# Patient Record
(demographics unavailable — no encounter records)

---

## 2024-10-15 NOTE — ADDENDUM
[FreeTextEntry1] : This note was partly authored by Bolivar Mackay working as a scribe for SEGUN Rico. I, SEGUN Rico, have reviewed the content of this note and confirm it is true and accurate. I personally performed the history and physical examination and made all the decisions. 10/10/2024.

## 2024-10-15 NOTE — ASSESSMENT
[FreeTextEntry1] : UA and urine cytology  We had a lengthy discussion about OAB meds, I explained the R&B's and the SE's which include elevated BP, dry mouth, dry eyes, constipation, and long term SE's including dementia. I also talked about what is known of Myrbetriq as it is in a different drug class. Pt expressed understanding and wants to hold off on medication  I explained timed voiding a behavioral technique where you void about every 3 hours even against the urge to hold your urine. Pt expresses understanding.  Also mentioned that only voiding 3 or 4 x's/ day is not "too frequent".  fu 6 weeks   As brother has h/o of prostate ca, was curious what the PSA levels were when brother diagnosed.

## 2024-10-15 NOTE — HISTORY OF PRESENT ILLNESS
[FreeTextEntry1] : 10/10/2024: SANTHOSH PRESTON is a 54 year-old M h/o pancreatitis (taking Creon) who presents with c/o urinary frequency. past smoker for 15 years about 2 packs a day  Recent GI visit on 3/5/2024 and as per GI note he admits to significant drinking approximately 3 times a week. 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   Denies h/o kidney stones, gross blood in urine  Urinary frequency 3-4x's/day. Reports that on the way to the bathroom he nearly wets himself and when starting to urinate "it comes out very fast". Has been happening for several years.   Stream is strong and steady. After voiding he feels satisfied and empty.  Denies straining, post void dribbling, or double voiding.  Denies h/o surgery  Had x1 colonoscopy and was normal  1/30/2024 CT Abd/P w IV cont showed: kidneys normal, bladder normal  PSA at 0.25 ng/mL on 5/21/2024  A1CG in 2022 showed pt was high risk (pre dm)  FMHx: Denies kidney bladder ca Brother dx'ed with prostate ca at 58 yo, now 62 yo. Brother did not have surgery and is alive managing his ca. Mom living at 97 yo Dad passed at 86 by natural old age as per pt. He did have stroke 7 years before passing.  last voided 4 hours ago. he does not have the urge sensation to urinate right now PVR at 51 cc

## 2024-10-15 NOTE — HISTORY OF PRESENT ILLNESS
[FreeTextEntry1] : 10/10/2024: SANTHOSH PRESTON is a 54 year-old M h/o pancreatitis (taking Creon) who presents with c/o urinary frequency. past smoker for 15 years about 2 packs a day  Recent GI visit on 3/5/2024 and as per GI note he admits to significant drinking approximately 3 times a week. 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   Denies h/o kidney stones, gross blood in urine  Urinary frequency 3-4x's/day. Reports that on the way to the bathroom he nearly wets himself and when starting to urinate "it comes out very fast". Has been happening for several years.   Stream is strong and steady. After voiding he feels satisfied and empty.  Denies straining, post void dribbling, or double voiding.  Denies h/o surgery  Had x1 colonoscopy and was normal  1/30/2024 CT Abd/P w IV cont showed: kidneys normal, bladder normal  PSA at 0.25 ng/mL on 5/21/2024  A1CG in 2022 showed pt was high risk (pre dm)  FMHx: Denies kidney bladder ca Brother dx'ed with prostate ca at 58 yo, now 60 yo. Brother did not have surgery and is alive managing his ca. Mom living at 95 yo Dad passed at 86 by natural old age as per pt. He did have stroke 7 years before passing.  last voided 4 hours ago. he does not have the urge sensation to urinate right now PVR at 51 cc

## 2024-10-15 NOTE — HISTORY OF PRESENT ILLNESS
[FreeTextEntry1] : 10/10/2024: SANTHOSH PRESTON is a 54 year-old M h/o pancreatitis (taking Creon) who presents with c/o urinary frequency. past smoker for 15 years about 2 packs a day  Recent GI visit on 3/5/2024 and as per GI note he admits to significant drinking approximately 3 times a week. 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   Denies h/o kidney stones, gross blood in urine  Urinary frequency 3-4x's/day. Reports that on the way to the bathroom he nearly wets himself and when starting to urinate "it comes out very fast". Has been happening for several years.   Stream is strong and steady. After voiding he feels satisfied and empty.  Denies straining, post void dribbling, or double voiding.  Denies h/o surgery  Had x1 colonoscopy and was normal  1/30/2024 CT Abd/P w IV cont showed: kidneys normal, bladder normal  PSA at 0.25 ng/mL on 5/21/2024  A1CG in 2022 showed pt was high risk (pre dm)  FMHx: Denies kidney bladder ca Brother dx'ed with prostate ca at 60 yo, now 62 yo. Brother did not have surgery and is alive managing his ca. Mom living at 97 yo Dad passed at 86 by natural old age as per pt. He did have stroke 7 years before passing.  last voided 4 hours ago. he does not have the urge sensation to urinate right now PVR at 51 cc

## 2025-02-26 NOTE — PLAN
[FreeTextEntry1] : Venipuncture with labs drawn in office today. Continue sildenafil as needed for erectile dysfunction. Continue Ramipril for Hypertension. Medications reviewed. Continue present medications. Return for annual physical in 3 months.

## 2025-02-26 NOTE — HISTORY OF PRESENT ILLNESS
[FreeTextEntry1] : follow up. [de-identified] : SANTHOSH PRESTON is a 54 year old male, with history of Hypertension, who presents to the office for a follow-up visit. The patient reports to be feeling reasonably well at this time with no complaints.

## 2025-02-26 NOTE — ASSESSMENT
[FreeTextEntry1] : Dr. Ivy reviewed with Levar his metabolic panel in office today. Panel indicated that Levar was dehydrated at the time of the test.  Dr. Ivy reviewed with Levar his vitals in office today. Levar's vitals were normal  Levar should return in 3 months for annual physical.

## 2025-02-26 NOTE — HISTORY OF PRESENT ILLNESS
[FreeTextEntry1] : follow up. [de-identified] : SANTHOSH PRESTON is a 54 year old male, with history of Hypertension, who presents to the office for a follow-up visit. The patient reports to be feeling reasonably well at this time with no complaints.

## 2025-02-26 NOTE — SIGNATURES
[TextEntry] : This note was written by Francesco Payne on 02/25/2025 acting as medical scribe for Dr. Rolanda Ivy. I, Dr. Rolanda Ivy, have read and attest that all the information, medical decision making and discharge instructions within are true and accurate.

## 2025-07-15 NOTE — PHYSICAL EXAM
[No Acute Distress] : no acute distress [Well Nourished] : well nourished [Well Developed] : well developed [Well-Appearing] : well-appearing [Normal Sclera/Conjunctiva] : normal sclera/conjunctiva [PERRL] : pupils equal round and reactive to light [EOMI] : extraocular movements intact [Normal Outer Ear/Nose] : the outer ears and nose were normal in appearance [Normal Oropharynx] : the oropharynx was normal [No JVD] : no jugular venous distention [No Lymphadenopathy] : no lymphadenopathy [Supple] : supple [Thyroid Normal, No Nodules] : the thyroid was normal and there were no nodules present [No Respiratory Distress] : no respiratory distress  [No Accessory Muscle Use] : no accessory muscle use [Clear to Auscultation] : lungs were clear to auscultation bilaterally [Normal Rate] : normal rate  [Regular Rhythm] : with a regular rhythm [Normal S1, S2] : normal S1 and S2 [No Murmur] : no murmur heard [No Carotid Bruits] : no carotid bruits [No Abdominal Bruit] : a ~M bruit was not heard ~T in the abdomen [No Varicosities] : no varicosities [Pedal Pulses Present] : the pedal pulses are present [No Edema] : there was no peripheral edema [No Palpable Aorta] : no palpable aorta [No Extremity Clubbing/Cyanosis] : no extremity clubbing/cyanosis [Soft] : abdomen soft [Non Tender] : non-tender [Non-distended] : non-distended [No Masses] : no abdominal mass palpated [No HSM] : no HSM [Normal Bowel Sounds] : normal bowel sounds [Normal Posterior Cervical Nodes] : no posterior cervical lymphadenopathy [Normal Anterior Cervical Nodes] : no anterior cervical lymphadenopathy [No CVA Tenderness] : no CVA  tenderness [No Spinal Tenderness] : no spinal tenderness [No Joint Swelling] : no joint swelling [Grossly Normal Strength/Tone] : grossly normal strength/tone [No Rash] : no rash [Coordination Grossly Intact] : coordination grossly intact [No Focal Deficits] : no focal deficits [Normal Gait] : normal gait [Deep Tendon Reflexes (DTR)] : deep tendon reflexes were 2+ and symmetric [Normal Affect] : the affect was normal [Normal Insight/Judgement] : insight and judgment were intact Principal Discharge DX:	UTI (urinary tract infection)

## 2025-07-16 NOTE — HISTORY OF PRESENT ILLNESS
[FreeTextEntry1] : annual wellness visit [de-identified] : SANTHOSH PRESTON is a 55-year-old male, with history of Hypertension, who presents to the office for a follow-up visit. The patient reports to be feeling reasonably well at this time with no complaints.  SANTHOSH was in [place] 3 weeks ago where he had a viral infection, he was vomiting, dizzy and blurred vision. he was told he had vertigo, he was treated with Stemetil, he notes this has mainly resolved, he feels slight dizziness when making quick movements  He is wanting ophthalmologist and ENT referrals

## 2025-07-16 NOTE — DATA REVIEWED
[FreeTextEntry1] : EKG shows sinus bradycardic rhythm at 52 bpm, left atrial enlargement, LVH, poor R wave progression. -------------------- Bone density: Normal. ----------------------  POCT - A Urinalysis Dip (Automated)             Final  No Documents Attached    	Test  	Result  	Flag	Reference	Goal  	Glucose	NEG	 	 	  	Bilirubin	NEG	 	 	  	Ketone	NEG	 	 	  	Specific Gravity	1.015	 	 	  	Blood	NEG	 	 	  	pH	7.0	 	 	  	Protein	NEG	 	 	  	Urobilinogen	0.2 EU/dl	 	 	  	Nitrite	NEG	 	 	  	Leukocytes	NEG	 	 	  	Clarity	Clear	 	 	  	Collection Method	Void	 	 	  Ordered by: FRANCES BOND       Collected/Examined: 15-Jul-2025 01:48 pm       Verified by: FRANCES BOND 16-Jul-2025 07:12 am       Result Communication: No patient communication needed at this time; Stage: Final       Performed at: In Office       Performed by: FRANCES BOND       Resulted: 15-Jul-2025 01:48 pm       Last Updated: 16-Jul-2025 07:12 am       Accession: [id number]

## 2025-07-16 NOTE — SIGNATURES
[TextEntry] :   This note was written by Meghna Lozano on 07/15/2025 acting as medical scribe for Dr. Rolanda Ivy.

## 2025-07-16 NOTE — DATA REVIEWED
[FreeTextEntry1] : EKG shows sinus bradycardic rhythm at 52 bpm, left atrial enlargement, LVH, poor R wave progression. -------------------- Bone density: Normal. ----------------------  POCT - A Urinalysis Dip (Automated)             Final  No Documents Attached    	Test  	Result  	Flag	Reference	Goal  	Glucose	NEG	 	 	  	Bilirubin	NEG	 	 	  	Ketone	NEG	 	 	  	Specific Gravity	1.015	 	 	  	Blood	NEG	 	 	  	pH	7.0	 	 	  	Protein	NEG	 	 	  	Urobilinogen	0.2 EU/dl	 	 	  	Nitrite	NEG	 	 	  	Leukocytes	NEG	 	 	  	Clarity	Clear	 	 	  	Collection Method	Void	 	 	  Ordered by: FRANCES BOND       Collected/Examined: 15-Jul-2025 01:48 pm       Verified by: FRANCES BOND 16-Jul-2025 07:12 am       Result Communication: No patient communication needed at this time; Stage: Final       Performed at: In Office       Performed by: FRANCES OBND       Resulted: 15-Jul-2025 01:48 pm       Last Updated: 16-Jul-2025 07:12 am       Accession: [id number]

## 2025-07-16 NOTE — HISTORY OF PRESENT ILLNESS
[FreeTextEntry1] : annual wellness visit [de-identified] : SANTHOSH PRESTON is a 55-year-old male, with history of Hypertension, who presents to the office for a follow-up visit. The patient reports to be feeling reasonably well at this time with no complaints.  SANTHOSH was in [place] 3 weeks ago where he had a viral infection, he was vomiting, dizzy and blurred vision. he was told he had vertigo, he was treated with Stemetil, he notes this has mainly resolved, he feels slight dizziness when making quick movements  He is wanting ophthalmologist and ENT referrals

## 2025-07-16 NOTE — PLAN
[FreeTextEntry1] : Venipuncture with labs drawn in office today. Age-appropriate counseling and preventive care screening discussed. Bone density preformed in office today  EKG obtained and reviewed with patient, in comparison to prior- unchanged  Continue sildenafil as needed for erectile dysfunction. Continue Ramipril for Hypertension. Should undergo follow up colonoscopy with Dr. Douglass  Referred to Dr. Castillo for cardiology patient for abnormal EKG. Medications reviewed. Continue present medications.